# Patient Record
(demographics unavailable — no encounter records)

---

## 2024-10-24 NOTE — DISCUSSION/SUMMARY
[FreeTextEntry1] : 55 year old woman who is very anxious.  She has IBS and fibromyalgia.  She takes gabapentin and duloxetine.  She has a diagnosis of asthma, recently.  She has chronic rhinitis. postnasal drip .  She has been told she needs balloon procedure.  However, CT and MRI head did not demonstrate significant sinusitis  She does have rhinitis on exam  She has dogs.   Her main concern is that she has had several TIA's.  She has a PFO with R-L shunt on DANIA.  Closure was recommended in 2019 but she did not undergo  She was admitted several times at  with TIA and HTN.  MRI did not demonstrate TIA but she developed left sided weakness especially arm and hand  She is now eager to have PFO closure.  She was seen at [facility] and was told she ? did not qualify.  Details are unclear  She ues Advair  Her lungs are clear   PLAN  Obtain PFT  use Advair [id number] twice pr day  use Flonase and nasal saline  blood allergy testing  Refer back to ENT  refer to cardiology  chart reviewed  outside records reviewed  Pt was counselled   Total time of the encounter: 45 minutes which included but was not limited to the following: Face-to-face and non face-to-face time personally spent by the physician preparing to see the patient, obtaining and/or resuming separately obtained history, performing a medically appropriate examination and/or evaluation, counseling and educating the patient/family/caregiver, ordering medications, tests or procedures, referring and communicating with other healthcare professionals, documenting clinical information in the electronic health record, independently interpreting results and communicated results to the patient/family/caregiver and care coordination.      Emmett Fried MD

## 2024-10-24 NOTE — HISTORY OF PRESENT ILLNESS
[Never] : never [TextBox_4] : 55 year old patient presents for evaluation of  dyspnea and cough  She is very anxious and tearful because of her cardiac condition as well as peripheral weakness.   She has a PFO with R-L shunt on DANIA.  This has been known since 2019 when she was hospitalized at [facility].  She had discussion of PFO closure but did not proceed. She has intermittent left arm weakness and slurred speech  She has had  TIA x4, vertigo   She has chronic postnasal drip , asthma, HTN, HLD,  She also has  IBS (diarrhea type) with diverticulosis . Pt states  started having weakness in her left upper and lower extremity, along with slurred speech and dizziness. Pt states her blood pressure was in the 200s   She uses Advair once per day and albuterol MDI   She uses saline nasal spray   She has seen ENT/sinus specialist, told she requires ballon procedure   Primary doctor is   DR Hall, [facility]     PSH:    laparoscopy  appendectomy   :tonsils foot surgery  PMH  Hypertension  VERTIGO  Asthma migraines  IBS (irritable bowel syndrome)   Fibromyalgia   History of TIAs    SH:   never smoker  ETOH:  none     Occupation: no   No exposure to chemicals, dust, asbestos, mold       ALLERGY:   NKDA   allergic to  sulfa, carvedilol, cipro, penicillin   environmental/seasonal allergy:  some       Review of Systems:   no dysphagia no dry mouth   has arthritis  no joint swelling    has asthma no pneumonia no wheeze no lung cancer   no CAD no MI  no edema   has IBS  GERD  has digestive issues    pre Diabetes no thyroid disease has hyperlipidemia     no bleeding   no DVT or PE   no kidney disease

## 2024-10-24 NOTE — HISTORY OF PRESENT ILLNESS
[Never] : never [TextBox_4] : 55 year old patient presents for evaluation of  dyspnea and cough  She is very anxious and tearful because of her cardiac condition as well as peripheral weakness.   She has a PFO with R-L shunt on DANIA.  This has been known since 2019 when she was hospitalized at [facility].  She had discussion of PFO closure but did not proceed. She has intermittent left arm weakness and slurred speech  She has had  TIA x4, vertigo   She has chronic postnasal drip , asthma, HTN, HLD,  She also has  IBS (diarrhea type) with diverticulosis . Pt states  started having weakness in her left upper and lower extremity, along with slurred speech and dizziness. Pt states her blood pressure was in the 200s   She uses Advair once per day and albuterol MDI   She uses saline nasal spray   She has seen ENT/sinus specialist, told she requires ballon procedure   Primary doctor is   DR Hall, [facility]     PSH:    laparoscopy  appendectomy   :tonsils foot surgery  PMH  Hypertension  VERTIGO  Asthma migraines  IBS (irritable bowel syndrome)   Fibromyalgia   History of TIAs    SH:   never smoker  ETOH:  none     Occupation: no   No exposure to chemicals, dust, asbestos, mold       ALLERGY:   NKDA   allergic to  sulfa, carvedilol, cipro, penicillin   environmental/seasonal allergy:  some       Review of Systems:   no dysphagia no dry mouth   has arthritis  no joint swelling    has asthma no pneumonia no wheeze no lung cancer   no CAD no MI  no edema   has IBS  GERD  has digestive issues    pre Diabetes no thyroid disease has hyperlipidemia     no bleeding   no DVT or PE   no kidney disease

## 2024-10-24 NOTE — PHYSICAL EXAM
[No Acute Distress] : no acute distress [Normal Oropharynx] : normal oropharynx [I] : Mallampati Class: I [Normal Appearance] : normal appearance [No Neck Mass] : no neck mass [Normal Rate/Rhythm] : normal rate/rhythm [Normal S1, S2] : normal s1, s2 [No Murmurs] : no murmurs [No Resp Distress] : no resp distress [Clear to Auscultation Bilaterally] : clear to auscultation bilaterally [No Abnormalities] : no abnormalities [Benign] : benign [Normal Gait] : normal gait [No Clubbing] : no clubbing [No Cyanosis] : no cyanosis [No Edema] : no edema [FROM] : FROM [Normal Color/ Pigmentation] : normal color/ pigmentation [No Focal Deficits] : no focal deficits [Oriented x3] : oriented x3 [Normal Affect] : normal affect [TextBox_2] : very anxious [TextBox_11] : mild facial asymmetry [TextBox_68] : no wheeze

## 2024-10-24 NOTE — DISCUSSION/SUMMARY
[FreeTextEntry1] : 55 year old woman who is very anxious.  She has IBS and fibromyalgia.  She takes gabapentin and duloxetine.  She has a diagnosis of asthma, recently.  She has chronic rhinitis. postnasal drip .  She has been told she needs balloon procedure.  However, CT and MRI head did not demonstrate significant sinusitis  She does have rhinitis on exam  She has dogs.   Her main concern is that she has had several TIA's.  She has a PFO with R-L shunt on DANIA.  Closure was recommended in 2019 but she did not undergo  She was admitted several times at  with TIA and HTN.  MRI did not demonstrate TIA but she developed left sided weakness especially arm and hand  She is now eager to have PFO closure.  She was seen at [facility] and was told she ? did not qualify.  Details are unclear  She ues Advair  Her lungs are clear   PLAN  Obtain PFT  use Advair [id number] twice pr day  use Flonase and nasal saline  blood allergy testing  Refer back to ENT  refer to cardiology  chart reviewed  outside records reviewed  Pt was counselled   Total time of the encounter: 45 minutes which included but was not limited to the following: Face-to-face and non face-to-face time personally spent by the physician preparing to see the patient, obtaining and/or resuming separately obtained history, performing a medically appropriate examination and/or evaluation, counseling and educating the patient/family/caregiver, ordering medications, tests or procedures, referring and communicating with other healthcare professionals, documenting clinical information in the electronic health record, independently interpreting results and communicated results to the patient/family/caregiver and care coordination.      Emmett Fried MD

## 2024-10-30 NOTE — REASON FOR VISIT
[FreeTextEntry1] : PCP/Referring Doctor: Chief Complaint:  ------------------------------------------------------------------------------------------------------------------------------------ History of Present Illness: 55F with mild non-obstructive CAD, anxiety disorder HLD, HTN, multiple TIA, PFO with significant shunting presents for follow-up  Patient previously followed with Dr. Mora was recommended to have PFO closure for stroke prevention, but patient did not follow up in 2019.  Recently followed up with [place] but was not considered to PFO closure, unclear reasons.  ROS:  chest pain (-), dyspnea with exertion (-), orthopnea (-), edema (-), palpitations (-), dizziness (-), anxiety (+)  All other systems were reviewed and are negative. ------------------------------------------------------------------------------------------------------------------------------------ Past Medical History: Any history of HTN? n Any history of Lipid disorders? TG  LDL  Any history Diabetes or Prediabetes? HbA1c  Any history of MI, stroke or TIA?  Any prior Stress Testing?  Any prior Cath/Angiogram procedure?  Any prior Echocardiogram (TTE)? Any prior vascular study?  Have patient been on blood thinners?  Have patient had cardiac or vascular surgeries? ------------------------------------------------------------------------------------------------------------------------------------ Patient's current cardiovascular medications were reviewed and updated in chart. ------------------------------------------------------------------------------------------------------------------------------------ Family history Do you have a family history of heart attacks and/or stroke before the age of 60? Do you have a family history of cardiac arrest? ------------------------------------------------------------------------------------------------------------------------------------ Social History: Do you currently or previously used tobacco including cigarettes and vapes?  How many alcoholic drinks per week? 0-3, 3-7, >8 What is your occupation?  ------------------------------------------------------------------------------------------------------------------------------------ Physical Exam: General appearance: NAD Lungs: CTAB CV: RRR Extremities: No peripheral edema.

## 2024-10-30 NOTE — REASON FOR VISIT
[FreeTextEntry1] : PCP/Referring Doctor: Chief Complaint:  ------------------------------------------------------------------------------------------------------------------------------------ History of Present Illness: 55F with mild non-obstructive CAD, anxiety disorder HLD, HTN, multiple TIA, PFO with significant shunting presents for follow-up  Patient previously followed with Dr. Mora was recommended to have PFO closure for stroke prevention, but patient did not follow up in 2019.  Recently followed up with [place] but was not considered to PFO closure, unclear reasons.  ROS:  chest pain (-), dyspnea with exertion (-), orthopnea (-), edema (-), palpitations (-), dizziness (-), anxiety (+)  All other systems were reviewed and are negative. ------------------------------------------------------------------------------------------------------------------------------------ Past Medical History: Any history of HTN? n Any history of Lipid disorders? TG  LDL  Any history Diabetes or Prediabetes? HbA1c  Any history of MI, stroke or TIA?  Any prior Stress Testing?  Any prior Cath/Angiogram procedure?  Any prior Echocardiogram (TTE)? Any prior vascular study?  Have patient been on blood thinners?  Have patient had cardiac or vascular surgeries? ------------------------------------------------------------------------------------------------------------------------------------ Patient's current cardiovascular medications were reviewed and updated in chart. ------------------------------------------------------------------------------------------------------------------------------------ Family history Do you have a family history of heart attacks and/or stroke before the age of 60? Do you have a family history of cardiac arrest? ------------------------------------------------------------------------------------------------------------------------------------ Social History: Do you currently or previously used tobacco including cigarettes and vapes?  How many alcoholic drinks per week? 0-3, 3-7, >8 What is your occupation?  ------------------------------------------------------------------------------------------------------------------------------------ Physical Exam: General appearance: NAD Lungs: CTAB CV: RRR Extremities: No peripheral edema.

## 2024-10-30 NOTE — ASSESSMENT
[FreeTextEntry1] : Problems Assessed: 1. PFO w/ intracardiac shunting  2. CVA  3. HLD  4. HTN  5. CAD  Plan: - continue aspirin 81 mg daily and atorvastatin 20 mg daily, goal LDL <55  - will reach out to Dr. Mora to rediscuss PFO closure, patient is agreeable.  - continue losartan-HCTZ at current dose.   Follow-up: 1 month  ----------------------------------------------------------------------------------------- Billing Statement: ECG obtained in the diagnosis and treatment of assessed problems.  Total time spent on this encounter was 45 minutes. This includes non-face-to-face time before the visit when I reviewed relevant portions of the patient's medical record and time spent on documentation after the visit. During face-to-face time, I took a relevant history, examined the patient and explained differential diagnoses, relevant cardiac diagnoses, workup, and management plan.

## 2024-11-22 NOTE — HISTORY OF PRESENT ILLNESS
[Never] : never [TextBox_4] : 55 year old patient presents for evaluation of  dyspnea and cough  She is very anxious and tearful because of her cardiac condition as well as peripheral weakness.   She has a PFO with R-L shunt on DANIA.  This has been known since 2019 when she was hospitalized at [facility].  She had discussion of PFO closure but did not proceed. She has intermittent left arm weakness and slurred speech  She has had  TIA x4, vertigo   She has chronic postnasal drip , asthma, HTN, HLD,  She also has  IBS (diarrhea type) with diverticulosis . Pt states  started having weakness in her left upper and lower extremity, along with slurred speech and dizziness. Pt states her blood pressure was in the 200s   She uses Advair once per day and albuterol MDI   She uses saline nasal spray   She has seen ENT/sinus specialist, told she requires ballon procedure   Primary doctor is   DR Hall, [facility]     PSH:    laparoscopy  appendectomy   :tonsils foot surgery  PMH  Hypertension  VERTIGO  Asthma migraines  IBS (irritable bowel syndrome)   Fibromyalgia   History of TIAs    SH:   never smoker  ETOH:  none     Occupation: no   No exposure to chemicals, dust, asbestos, mold       ALLERGY:   NKDA   allergic to  sulfa, carvedilol, cipro, penicillin   environmental/seasonal allergy:  some       Review of Systems:   no dysphagia no dry mouth   has arthritis  no joint swelling    has asthma no pneumonia no wheeze no lung cancer   no CAD no MI  no edema   has IBS  GERD  has digestive issues    pre Diabetes no thyroid disease has hyperlipidemia     no bleeding   no DVT or PE   no kidney disease

## 2024-11-22 NOTE — DISCUSSION/SUMMARY
[FreeTextEntry1] : 55 year old woman who is very anxious.  She has IBS and fibromyalgia.  She takes gabapentin and duloxetine.  She has a diagnosis of asthma, recently.  She has chronic rhinitis. postnasal drip .  She has been told she needs balloon procedure.  However, CT and MRI head did not demonstrate significant sinusitis  She does have rhinitis on exam  She has cats and dogs.   Her main concern is that she has had several TIA's.  She has a PFO with R-L shunt on DANIA.  Closure was recommended in 2019 but she did not undergo  She was admitted several times at  with TIA and HTN.  MRI did not demonstrate TIA but she developed left sided weakness especially arm and hand  She is now eager to have PFO closure.  She was seen at [facility] and was told she ? did not qualify.  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   She also now reports that she has been treated recently for C difficile with vancomycin for 10 days PLAN  use Advair [id number] twice pr day  albuterol MDI as needed   use Flonase and nasal saline  allergen avoidance  would switch to Allegra rather than Zyrtec (If effective)  Refer back to ENT  follow with cardiology  I will refer her to allergist due to multiple food and other allergies as she has multiple symptoms that may be allergic in origin   chart reviewed  outside records reviewed  Pt was counselled   Total time of the encounter: 40 minutes which included but was not limited to the following: Face-to-face and non face-to-face time personally spent by the physician preparing to see the patient, obtaining and/or resuming separately obtained history, performing a medically appropriate examination and/or evaluation, counseling and educating the patient/family/caregiver, ordering medications, tests or procedures, referring and communicating with other healthcare professionals, documenting clinical information in the electronic health record, independently interpreting results and communicated results to the patient/family/caregiver and care coordination.      Emmett Fried MD

## 2024-11-22 NOTE — DISCUSSION/SUMMARY
[FreeTextEntry1] : 55 year old woman who is very anxious.  She has IBS and fibromyalgia.  She takes gabapentin and duloxetine.  She has a diagnosis of asthma, recently.  She has chronic rhinitis. postnasal drip .  She has been told she needs balloon procedure.  However, CT and MRI head did not demonstrate significant sinusitis  She does have rhinitis on exam  She has cats and dogs.   Her main concern is that she has had several TIA's.  She has a PFO with R-L shunt on DANIA.  Closure was recommended in 2019 but she did not undergo  She was admitted several times at  with TIA and HTN.  MRI did not demonstrate TIA but she developed left sided weakness especially arm and hand  She is now eager to have PFO closure.  She was seen at [facility] and was told she ? did not qualify.  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   She also now reports that she has been treated recently for C difficile with vancomycin for 10 days PLAN  use Advair [id number] twice pr day  albuterol MDI as needed   use Flonase and nasal saline  allergen avoidance  would switch to Allegra rather than Zyrtec (If effective)  Refer back to ENT  follow with cardiology  I will refer her to allergist due to multiple food and other allergies as she has multiple symptoms that may be allergic in origin   chart reviewed  outside records reviewed  Pt was counselled   Total time of the encounter: 40 minutes which included but was not limited to the following: Face-to-face and non face-to-face time personally spent by the physician preparing to see the patient, obtaining and/or resuming separately obtained history, performing a medically appropriate examination and/or evaluation, counseling and educating the patient/family/caregiver, ordering medications, tests or procedures, referring and communicating with other healthcare professionals, documenting clinical information in the electronic health record, independently interpreting results and communicated results to the patient/family/caregiver and care coordination.      Emmett Fried MD

## 2024-11-22 NOTE — ASSESSMENT
[FreeTextEntry1] : In summary, Ms. GTZ presents with lower extremity spider veins and history of multiple strokes. A venous duplex was performed in the office today and showed no evidence of DVT or SVT or significant reflux in either extremity. I provided her a prescription for knee high 20-30 mmHg compression stockings and instructed her to wear these daily and remove at night. She should keep her legs elevated when possible. She may have some autonomic dysregulation following stroke.   Carotid duplex showed no evidence of carotid stenosis and antegrade flow in the vertebral arteries.    She will follow up with me as needed.  Thank you for allowing me to participate in the care of this nice woman. Please do not hesitate to contact me with any questions.

## 2024-11-22 NOTE — HISTORY OF PRESENT ILLNESS
[Never] : never [TextBox_4] : 55 year old patient presents for evaluation of  dyspnea and cough  She is very anxious and tearful because of her cardiac condition as well as peripheral weakness.   She has a PFO with R-L shunt on DANIA.  This has been known since 2019 when she was hospitalized at [facility].  She had discussion of PFO closure but did not proceed. She has intermittent left arm weakness and slurred speech  She has had  TIA x4, vertigo   She has chronic postnasal drip , asthma, HTN, HLD,  She also has  IBS (diarrhea type) with diverticulosis . Pt states  started having weakness in her left upper and lower extremity, along with slurred speech and dizziness. Pt states her blood pressure was in the 200s   She uses Advair once per day and albuterol MDI   She uses saline nasal spray   She has seen ENT/sinus specialist, told she requires ballon procedure   Primary doctor is   DR Hall, [facility]     PSH:    laparoscopy  appendectomy   :tonsils foot surgery  PMH  Hypertension  VERTIGO  Asthma migraines  IBS (irritable bowel syndrome)   Fibromyalgia   History of TIAs    SH:   never smoker  ETOH:  none     Occupation: no   No exposure to chemicals, dust, asbestos, mold       ALLERGY:   NKDA   allergic to  sulfa, carvedilol, cipro, penicillin   environmental/seasonal allergy:  some       Review of Systems:   no dysphagia no dry mouth   has arthritis  no joint swelling    has asthma no pneumonia no wheeze no lung cancer   no CAD no MI  no edema   has IBS  GERD  has digestive issues    pre Diabetes no thyroid disease has hyperlipidemia     no bleeding   no DVT or PE   no kidney disease

## 2024-11-22 NOTE — PHYSICAL EXAM
[de-identified] : On physical examination the patient is in no acute distress. Mild facial asymmetry. Left sided hemiparesis with motor function 2-3/5. The lungs are clear to auscultation and the heart has a regular rate and rhythm. Abdomen is benign. Bilateral femoral and pedal pulses are palpable. Spider veins of the thighs.

## 2024-12-04 NOTE — REASON FOR VISIT
[FreeTextEntry1] : PCP/Referring Doctor: Chief Complaint:  ------------------------------------------------------------------------------------------------------------------------------------ History of Present Illness: 55F with mild non-obstructive CAD, anxiety disorder HLD, HTN, multiple TIA, PFO with significant shunting presents for follow-up  Patient previously followed with Dr. Mora was recommended to have PFO closure for stroke prevention, but patient did not follow up in 2019.  Recently followed up with [facility] but was not considered to PFO closure, unclear reasons.  Has not had a chance to follow up with Dr. Mora.  ROS:  chest pain (-), dyspnea with exertion (-), orthopnea (-), edema (-), palpitations (-), dizziness (-), anxiety (+)  All other systems were reviewed and are negative. ------------------------------------------------------------------------------------------------------------------------------------ Past Medical History: Any history of HTN? n Any history of Lipid disorders? TG  LDL  Any history Diabetes or Prediabetes? HbA1c  Any history of MI, stroke or TIA?  Any prior Stress Testing?  Any prior Cath/Angiogram procedure?  Any prior Echocardiogram (TTE)? Any prior vascular study?  Have patient been on blood thinners?  Have patient had cardiac or vascular surgeries? ------------------------------------------------------------------------------------------------------------------------------------ Patient's current cardiovascular medications were reviewed and updated in chart. ------------------------------------------------------------------------------------------------------------------------------------ Family history Do you have a family history of heart attacks and/or stroke before the age of 60? Do you have a family history of cardiac arrest? ------------------------------------------------------------------------------------------------------------------------------------ Social History: Do you currently or previously used tobacco including cigarettes and vapes?  How many alcoholic drinks per week? 0-3, 3-7, >8 What is your occupation?  ------------------------------------------------------------------------------------------------------------------------------------ Physical Exam: General appearance: NAD Lungs: CTAB CV: RRR Extremities: No peripheral edema.

## 2024-12-04 NOTE — ASSESSMENT
[FreeTextEntry1] : Problems Assessed: 1. PFO w/ intracardiac shunting- recent DANIA at [facility] system under "outside records" in chart.  2. CVA  3. HLD  4. HTN  5. CAD  Plan: - continue aspirin 81 mg daily and atorvastatin 20 mg daily, goal LDL <55  - Information of Dr. Mora, Dr. Owen provided to patient - she stated she will decide and will reach out for assessment for PFO closure - continue losartan-HCTZ at current dose.   Follow-up: 3 month  ----------------------------------------------------------------------------------------- Billing Statement: ECG obtained in the diagnosis and treatment of assessed problems.  Total time spent on this encounter was 30 minutes. This includes non-face-to-face time before the visit when I reviewed relevant portions of the patient's medical record and time spent on documentation after the visit. During face-to-face time, I took a relevant history, examined the patient and explained differential diagnoses, relevant cardiac diagnoses, workup, and management plan.